# Patient Record
Sex: FEMALE | Race: WHITE | NOT HISPANIC OR LATINO | URBAN - METROPOLITAN AREA
[De-identification: names, ages, dates, MRNs, and addresses within clinical notes are randomized per-mention and may not be internally consistent; named-entity substitution may affect disease eponyms.]

---

## 2019-10-11 ENCOUNTER — IMPORTED ENCOUNTER (OUTPATIENT)
Dept: URBAN - METROPOLITAN AREA CLINIC 50 | Facility: CLINIC | Age: 19
End: 2019-10-11

## 2019-10-31 ENCOUNTER — IMPORTED ENCOUNTER (OUTPATIENT)
Dept: URBAN - METROPOLITAN AREA CLINIC 50 | Facility: CLINIC | Age: 19
End: 2019-10-31

## 2019-12-30 ENCOUNTER — IMPORTED ENCOUNTER (OUTPATIENT)
Dept: URBAN - METROPOLITAN AREA CLINIC 50 | Facility: CLINIC | Age: 19
End: 2019-12-30

## 2021-04-17 ASSESSMENT — VISUAL ACUITY
OS_CC: J1+
OD_CC: J1+
OS_CC: 20/25-1
OD_CC: 20/20+1
OS_CC: 20/20
OD_CC: 20/20

## 2021-04-17 ASSESSMENT — TONOMETRY
OS_IOP_MMHG: 15
OD_IOP_MMHG: 14

## 2022-12-28 ENCOUNTER — COMPREHENSIVE EXAM (OUTPATIENT)
Dept: URBAN - METROPOLITAN AREA CLINIC 52 | Facility: CLINIC | Age: 22
End: 2022-12-28

## 2022-12-28 PROCEDURE — 92015 DETERMINE REFRACTIVE STATE: CPT

## 2022-12-28 PROCEDURE — 92014 COMPRE OPH EXAM EST PT 1/>: CPT

## 2022-12-28 ASSESSMENT — KERATOMETRY
OD_K1POWER_DIOPTERS: 42.25
OD_AXISANGLE_DEGREES: 167
OD_AXISANGLE2_DEGREES: 77
OD_K2POWER_DIOPTERS: 43.00
OS_AXISANGLE2_DEGREES: 105
OS_K1POWER_DIOPTERS: 42.75
OS_AXISANGLE_DEGREES: 15
OS_K2POWER_DIOPTERS: 43.50

## 2022-12-28 ASSESSMENT — VISUAL ACUITY
OS_SC: 20/20
OD_PH: 20/25
OU_SC: J1+
OD_SC: 20/30

## 2022-12-28 ASSESSMENT — TONOMETRY
OS_IOP_MMHG: 15
OD_IOP_MMHG: 15

## 2022-12-30 ENCOUNTER — CONTACT LENSES/GLASSES VISIT (OUTPATIENT)
Dept: URBAN - METROPOLITAN AREA CLINIC 50 | Facility: CLINIC | Age: 22
End: 2022-12-30

## 2022-12-30 PROCEDURE — 92310-1E ESTABLISHED CL PATIENT SPHERICAL SINGLE VISION SOFT LENS EVALUATION

## 2022-12-30 ASSESSMENT — VISUAL ACUITY
OU_SC: 20/20
OS_SC: 20/15
OD_SC: 20/30

## 2022-12-30 ASSESSMENT — KERATOMETRY
OD_AXISANGLE2_DEGREES: 77
OD_K2POWER_DIOPTERS: 43.00
OD_AXISANGLE_DEGREES: 167
OS_K2POWER_DIOPTERS: 43.50
OS_K1POWER_DIOPTERS: 42.75
OS_AXISANGLE2_DEGREES: 105
OS_AXISANGLE_DEGREES: 15
OD_K1POWER_DIOPTERS: 42.25

## 2022-12-30 NOTE — PATIENT DISCUSSION
Advised against sleeping, showering or swimming in her lenses. Provided sample for her right eye to go home and try. She can call to finalize.